# Patient Record
Sex: MALE | Race: WHITE | NOT HISPANIC OR LATINO | URBAN - METROPOLITAN AREA
[De-identification: names, ages, dates, MRNs, and addresses within clinical notes are randomized per-mention and may not be internally consistent; named-entity substitution may affect disease eponyms.]

---

## 2024-05-22 ENCOUNTER — OFFICE VISIT (OUTPATIENT)
Dept: URGENT CARE | Facility: CLINIC | Age: 49
End: 2024-05-22
Payer: COMMERCIAL

## 2024-05-22 VITALS
OXYGEN SATURATION: 98 % | HEART RATE: 85 BPM | SYSTOLIC BLOOD PRESSURE: 149 MMHG | RESPIRATION RATE: 14 BRPM | DIASTOLIC BLOOD PRESSURE: 89 MMHG

## 2024-05-22 DIAGNOSIS — R07.9 LEFT-SIDED CHEST PAIN: Primary | ICD-10-CM

## 2024-05-22 PROCEDURE — 99212 OFFICE O/P EST SF 10 MIN: CPT | Performed by: PHYSICIAN ASSISTANT

## 2024-05-22 NOTE — PROGRESS NOTES
[facility] URGENT CARE   JOSE ALBERTO NOTE:      Name: Moses Nash, 49 y.o.    CSN:[id number]   MRN:[id number]    PCP: Dylan Toribio MD    ALL:  Not on File    History:    Chief Complaint: Chest Pain    Encounter Date: 5/22/2024  immediately at arrival    HPI: The history was obtained from the patient. Moses is a 49 y.o. male, who presents with a chief complaint of Chest Pain ongoing for 1 day with progressive pain affecting left chest with radiation into right arm, pain is 7/10 and described as heavy/weight feeling with notable sweating. He had the pain start while at work.     PMHx:    Past Medical History:   Diagnosis Date    Diabetes mellitus (Multi)               No current outpatient medications on file.     No current facility-administered medications for this visit.         PMSx:  No past surgical history on file.    Fam Hx: No family history on file.    SOC. Hx:     Social History     Socioeconomic History    Marital status: Single     Spouse name: Not on file    Number of children: Not on file    Years of education: Not on file    Highest education level: Not on file   Occupational History    Not on file   Tobacco Use    Smoking status: Unknown    Smokeless tobacco: Not on file   Substance and Sexual Activity    Alcohol use: Defer    Drug use: Defer    Sexual activity: Yes   Other Topics Concern    Not on file   Social History Narrative    Not on file     Social Determinants of Health     Financial Resource Strain: Not on file   Food Insecurity: Not on file   Transportation Needs: Not on file   Physical Activity: Not on file   Stress: Not on file   Social Connections: Not on file   Intimate Partner Violence: Not on file   Housing Stability: Not on file         Vitals:    05/22/24 1740   BP: 149/89   Pulse: 85   Resp: 14   SpO2: 98%                Physical Exam  Vitals reviewed.   Constitutional:       Appearance: Normal appearance. He is normal weight. He is diaphoretic.   HENT:       Head: Normocephalic and atraumatic.      Nose: Nose normal.      Mouth/Throat:      Mouth: Mucous membranes are moist.   Eyes:      Extraocular Movements: Extraocular movements intact.   Cardiovascular:      Rate and Rhythm: Normal rate and regular rhythm.      Heart sounds: Normal heart sounds. No murmur heard.  Pulmonary:      Effort: Pulmonary effort is normal.      Breath sounds: Normal breath sounds.   Abdominal:      General: Abdomen is flat.   Musculoskeletal:         General: Normal range of motion.      Cervical back: Normal range of motion and neck supple.      Right lower leg: No edema.      Left lower leg: No edema.   Skin:     General: Skin is warm.      Findings: No rash.   Neurological:      Mental Status: He is alert and oriented to person, place, and time.   Psychiatric:         Behavior: Behavior normal.         LABORATORY @ RADIOLOGICAL IMAGING (if done):           EKG obtained in office, noted NSR with HR 85bpm, no significant interval changes with IN, QRS, QT, some subtle changes in the inferior leads not otherwise explained  ____________________________________________________________________    I did personally review Moses's past medical history, surgical history, social history, as well as family history (when relevant).  In this case, I also oversaw the his drug management by reviewing his medication list, allergy list, as well as the medications that I prescribed during the UC course and/or recommended as an out-patient (including possible OTC medications such as acetaminophen, NSAIDs , etc).    After reviewing the items above, I did not look at previous medical documentation, such as recent hospitalizations, office visits, and/or recent consultations with PCP/specialist.                          SDOH:   Another factor that I considered in Moses's care was his Social Determinants of Health (SDOH). During this UC encounter, he did not have social determinants of health. Those SDOH  influencing Moses's care are: none      _____________________________________________________________________       COURSE/MEDICAL DECISION MAKING:    Moses is a 49 y.o., who presents with a working diagnosis of   1. Left-sided chest pain      After EKG obtained and concern for inferior changes were identified, 4-81mg ASA provided in office, EMS notified and arrived to take pt to [facility] ER, charge nurse was notified of the EMS transfer. Pt was stable and ambulatory out of the office with EMS personnel.         Keo Milian PA-C   Advanced Practice Provider  [facility] URGENT [facility]